# Patient Record
Sex: MALE | Race: BLACK OR AFRICAN AMERICAN | Employment: FULL TIME | URBAN - METROPOLITAN AREA
[De-identification: names, ages, dates, MRNs, and addresses within clinical notes are randomized per-mention and may not be internally consistent; named-entity substitution may affect disease eponyms.]

---

## 2018-01-31 ENCOUNTER — PATIENT OUTREACH (OUTPATIENT)
Dept: FAMILY MEDICINE CLINIC | Facility: CLINIC | Age: 53
End: 2018-01-31

## 2018-07-09 ENCOUNTER — PATIENT OUTREACH (OUTPATIENT)
Dept: FAMILY MEDICINE CLINIC | Facility: CLINIC | Age: 53
End: 2018-07-09

## 2019-10-17 ENCOUNTER — PATIENT OUTREACH (OUTPATIENT)
Dept: FAMILY MEDICINE CLINIC | Facility: CLINIC | Age: 54
End: 2019-10-17

## 2024-01-02 NOTE — PROGRESS NOTES
Voicemail full unable to leave message  Still pt of Dr Paola Horner?    enoxaparin (LOVENOX) injection 40 mg  40 mg SubCUTAneous Daily Leonel Dumont MD   40 mg at 01/02/24 0920    lactated ringers IV soln infusion   IntraVENous Continuous Leonel Dumont MD 75 mL/hr at 01/02/24 0610 Rate Verify at 01/02/24 0610    piperacillin-tazobactam (ZOSYN) 4,500 mg in sodium chloride 0.9 % 100 mL IVPB (vial-mate)  4,500 mg IntraVENous Q8H Leonel Dumont MD   Stopped at 01/02/24 1233    sodium chloride flush 0.9 % injection 5-40 mL  5-40 mL IntraVENous 2 times per day Leonel Dumont MD   10 mL at 01/01/24 2014    sodium chloride flush 0.9 % injection 5-40 mL  5-40 mL IntraVENous PRN Leonel Dumont MD        0.9 % sodium chloride infusion   IntraVENous PRN Leonel Dumont MD           PRN Medications  potassium chloride **OR** potassium alternative oral replacement **OR** potassium chloride, morphine, sodium chloride flush, sodium chloride    Objective  Most Recent Recorded Vitals  BP 96/79   Pulse 64   Temp 98.2 °F (36.8 °C) (Oral)   Resp 17   Ht 1.549 m (5' 1\")   Wt 60.3 kg (133 lb)   SpO2 97%   BMI 25.13 kg/m²   I/O last 3 completed shifts:  In: 7104.8 [P.O.:10; I.V.:3440.8; IV Piggyback:496.5]  Out: 125 [Stool:125]  No intake/output data recorded.    Physical Exam:  General: AAO to person/place/time/purpose, NAD, no labored breathing  Eyes: conjunctivae/corneas clear, sclera non icteric  Skin: color/texture/turgor normal, no rashes or lesions, central line in the left IJ  Lungs: CTAB, no retractions/use of accessory muscles, no vocal fremitus, no rhonchi, no crackle, no rales  Heart: regular rate, regular rhythm, no murmur  Abdomen: soft, NT, bowel sounds normal, ostomy leaking  Extremities: atraumatic, no edema  Neurologic: cranial nerves 2-12 grossly intact, no slurred speech    Most Recent Labs  Lab Results   Component Value Date    WBC 7.7 01/02/2024    HGB 10.3 (L) 01/02/2024    HCT 29.8 (L) 01/02/2024     01/02/2024     01/02/2024    K 3.4 (L)  01/02/2024     01/02/2024    CREATININE 0.8 01/02/2024    BUN 12 01/02/2024    CO2 24 01/02/2024    GLUCOSE 98 01/02/2024    ALT 57 (H) 01/02/2024    AST 51 (H) 01/02/2024    INR 1.2 12/29/2023    TSH 5.51 (H) 12/29/2023       XR CHEST PORTABLE   Final Result   1. Left IJ central venous catheter is present with distal tip at level of SVC.   2. Right PICC line is present with distal tip at level of SVC.   3. No pneumothorax.   4. No evidence of pneumonia or pleural effusion.         CT ABDOMEN PELVIS W IV CONTRAST Additional Contrast? None   Final Result   1. Redemonstration of postsurgical changes related to prior bowel resection   with left-sided ostomy.   2. Small loculated fluid and gas collection located in ventral abdominal   subcutaneous fat along left aspect of surgical scar.  This lesion is slightly   smaller compared to prior from September 14, 2023, yet may indicate residual   or recurrent abscess or fistula.  Repeat CT with oral contrast may be helpful   for further evaluation.  Clinical correlation recommended.   3. Diverticulosis.   4. No acute process in the abdomen or pelvis.         US GALLBLADDER RUQ   Final Result   Mild hepatomegaly. Status post cholecystectomy.         XR CHEST PORTABLE   Final Result   No acute process.             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 CLABSI (PICC line) with resolved severe sepsis and septic shock  ABX per ID  Cx noted/pending  Final PICC line tip culture pending    Possible ascending cholangitis  MRCP pending  GI

## (undated) NOTE — LETTER
[address] [facility], [address] [address], [address] Samra Cardozai Hussain Zidomenica  [phone]                1/31/2017      [address]          Dear Patient,   According to our records, you are due for